# Patient Record
Sex: MALE | Race: WHITE
[De-identification: names, ages, dates, MRNs, and addresses within clinical notes are randomized per-mention and may not be internally consistent; named-entity substitution may affect disease eponyms.]

---

## 2022-07-20 ENCOUNTER — HOSPITAL ENCOUNTER (EMERGENCY)
Dept: HOSPITAL 47 - EC | Age: 47
Discharge: HOME | End: 2022-07-20
Payer: COMMERCIAL

## 2022-07-20 VITALS
SYSTOLIC BLOOD PRESSURE: 134 MMHG | TEMPERATURE: 99.4 F | DIASTOLIC BLOOD PRESSURE: 88 MMHG | RESPIRATION RATE: 16 BRPM | HEART RATE: 85 BPM

## 2022-07-20 DIAGNOSIS — S93.402A: Primary | ICD-10-CM
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
## 2022-07-20 PROCEDURE — 99284 EMERGENCY DEPT VISIT MOD MDM: CPT

## 2022-07-20 NOTE — XR
EXAMINATION TYPE: XR foot complete LT

 

DATE OF EXAM: 7/20/2022

 

COMPARISON: NONE

 

HISTORY: Pain and swelling

 

TECHNIQUE: 3 views

 

FINDINGS: There is soft tissue swelling of the forefoot. Metatarsals appear intact. The toes appear i
ntact.

 

IMPRESSION: Soft tissue swelling. No fracture seen.

## 2022-07-20 NOTE — ED
General Adult HPI





- General


Chief complaint: Extremity Problem,Nontraumatic


Stated complaint: LT ankle injury, Post-op


Time Seen by Provider: 07/20/22 21:12


Source: patient, RN notes reviewed, old records reviewed


Mode of arrival: ambulatory


Limitations: no limitations





- History of Present Illness


Initial comments: 





27-year-old male sent in for DVT rule out.  Patient is 4 days postop surgery on 

his left patella.  This was performed at an outside hospital.  He's had 

increased swelling to his left foot and ankle and was sent in for evaluation of 

possible DVT.  He does state that he had injured the left ankle 3 days prior 

while attempting to non-weight-bear on the left.  He inverted his foot and his 

had pain and swelling which is worsened in the ankle since that time.  No 

reported fever.  Patient states his knee feels fine.





- Related Data


                                    Allergies











Allergy/AdvReac Type Severity Reaction Status Date / Time


 


No Known Allergies Allergy   Verified 07/20/22 21:06














Review of Systems


ROS Statement: 


Those systems with pertinent positive or pertinent negative responses have been 

documented in the HPI.





ROS Other: All systems not noted in ROS Statement are negative.





Past Medical History


Past Medical History: No Reported History


History of Any Multi-Drug Resistant Organisms: None Reported


Past Surgical History: Orthopedic Surgery


Past Psychological History: No Psychological Hx Reported


Smoking Status: Never smoker


Past Alcohol Use History: Occasional


Past Drug Use History: None Reported





General Exam


Limitations: no limitations


General appearance: alert, in no apparent distress


Head exam: Present: atraumatic, normocephalic


Eye exam: Present: normal appearance, PERRL


ENT exam: Present: normal exam


Neck exam: Present: normal inspection.  Absent: tenderness, meningismus


Respiratory exam: Present: normal lung sounds bilaterally.  Absent: respiratory 

distress, wheezes


Cardiovascular Exam: Present: regular rate, normal rhythm


GI/Abdominal exam: Absent: distended, tenderness


Extremities exam: Present: pedal edema, joint swelling, other (The left foot is 

swollen and ecchymotic.  DP and PT pulses are 2+.)


Neurological exam: Present: alert, oriented X3, CN II-XII intact.  Absent: motor

sensory deficit


Psychiatric exam: Present: normal affect, normal mood


Skin exam: Present: erythema (Warmth to the left foot.)





Course


                                   Vital Signs











  07/20/22





  21:01


 


Temperature 99.4 F


 


Pulse Rate 85


 


Respiratory 16





Rate 


 


Blood Pressure 134/88


 


O2 Sat by Pulse 96





Oximetry 














Medical Decision Making





- Medical Decision Making





47-year-old male with pain and swelling to the left foot.  The foot is 

ecchymotic.  Distal pulses are intact.  There is soft tissue swelling.  

Ultrasound as well as x-rays are performed.  X-rays are negative for acute 

fracture, showing soft tissue swelling.  Ultrasound performed, negative for DVT.

 Patient should continue to follow with his orthopedic surgeon.  Return with 

worsening or changing symptoms.





Disposition


Clinical Impression: 


 Ankle sprain





Disposition: HOME SELF-CARE


Condition: Fair


Instructions (If sedation given, give patient instructions):  Ankle Sprain (DC)


Additional Instructions: 


Please follow up with her orthopedic surgeon.  Please return with any new or 

worsening symptoms.


Is patient prescribed a controlled substance at d/c from ED?: No


Referrals: 


Lila Corey MD [Family Provider] - 1-2 days


Time of Disposition: 22:37

## 2022-07-20 NOTE — US
EXAMINATION TYPE: US venous doppler duplex LE LT

 

DATE OF EXAM: 7/20/2022 10:14 PM

 

COMPARISON: NONE

 

CLINICAL HISTORY: pain/swelling. Recent knee surgery

 

SIDE PERFORMED: Left  

 

TECHNIQUE:  The lower extremity deep venous system is examined utilizing real time linear array sonog
saman with graded compression, doppler sonography and color-flow sonography.

 

VESSELS IMAGED:

Common Femoral Vein

Deep Femoral Vein

Greater Saphenous Vein *

Femoral Vein

Popliteal Vein

Small Saphenous Vein *

Proximal Calf Veins

(* superficial vessels)

 

 

 

Left Leg:  Appears negative for DVT

 

 

 

IMPRESSION: No evidence of deep vein thrombosis in the left leg.

## 2022-07-20 NOTE — XR
EXAMINATION TYPE: XR ankle complete LT

 

DATE OF EXAM: 7/20/2022

 

COMPARISON: NONE

 

HISTORY: Ankle pain

 

TECHNIQUE: 3 views

 

FINDINGS: Ankle mortise is anatomic. There is soft tissue swelling around the ankle joint. I see no f
racture. Joint spaces are normal.

 

IMPRESSION: Soft tissue swelling. No fracture.